# Patient Record
Sex: FEMALE | Race: BLACK OR AFRICAN AMERICAN | NOT HISPANIC OR LATINO | Employment: UNEMPLOYED | URBAN - METROPOLITAN AREA
[De-identification: names, ages, dates, MRNs, and addresses within clinical notes are randomized per-mention and may not be internally consistent; named-entity substitution may affect disease eponyms.]

---

## 2024-06-07 ENCOUNTER — HOSPITAL ENCOUNTER (EMERGENCY)
Facility: HOSPITAL | Age: 64
Discharge: HOME | End: 2024-06-07
Payer: MEDICARE

## 2024-06-07 ENCOUNTER — APPOINTMENT (OUTPATIENT)
Dept: RADIOLOGY | Facility: HOSPITAL | Age: 64
End: 2024-06-07
Payer: MEDICARE

## 2024-06-07 VITALS
HEART RATE: 89 BPM | RESPIRATION RATE: 18 BRPM | TEMPERATURE: 96.5 F | BODY MASS INDEX: 37.38 KG/M2 | OXYGEN SATURATION: 99 % | WEIGHT: 198 LBS | SYSTOLIC BLOOD PRESSURE: 173 MMHG | DIASTOLIC BLOOD PRESSURE: 84 MMHG | HEIGHT: 61 IN

## 2024-06-07 DIAGNOSIS — M25.551 PAIN OF RIGHT HIP: ICD-10-CM

## 2024-06-07 DIAGNOSIS — M25.511 ACUTE PAIN OF RIGHT SHOULDER: ICD-10-CM

## 2024-06-07 DIAGNOSIS — M25.561 ACUTE PAIN OF RIGHT KNEE: Primary | ICD-10-CM

## 2024-06-07 PROCEDURE — 73552 X-RAY EXAM OF FEMUR 2/>: CPT | Mod: RT

## 2024-06-07 PROCEDURE — 99284 EMERGENCY DEPT VISIT MOD MDM: CPT

## 2024-06-07 PROCEDURE — 73030 X-RAY EXAM OF SHOULDER: CPT | Mod: RT

## 2024-06-07 PROCEDURE — 73564 X-RAY EXAM KNEE 4 OR MORE: CPT | Mod: RT

## 2024-06-07 PROCEDURE — 73552 X-RAY EXAM OF FEMUR 2/>: CPT | Mod: RIGHT SIDE | Performed by: INTERNAL MEDICINE

## 2024-06-07 PROCEDURE — 2500000001 HC RX 250 WO HCPCS SELF ADMINISTERED DRUGS (ALT 637 FOR MEDICARE OP): Mod: SE

## 2024-06-07 PROCEDURE — 73030 X-RAY EXAM OF SHOULDER: CPT | Mod: RIGHT SIDE | Performed by: INTERNAL MEDICINE

## 2024-06-07 PROCEDURE — 73502 X-RAY EXAM HIP UNI 2-3 VIEWS: CPT | Mod: RT

## 2024-06-07 PROCEDURE — 73502 X-RAY EXAM HIP UNI 2-3 VIEWS: CPT | Mod: RIGHT SIDE | Performed by: INTERNAL MEDICINE

## 2024-06-07 PROCEDURE — 73564 X-RAY EXAM KNEE 4 OR MORE: CPT | Mod: RIGHT SIDE | Performed by: INTERNAL MEDICINE

## 2024-06-07 RX ORDER — ACETAMINOPHEN 325 MG/1
650 TABLET ORAL EVERY 6 HOURS PRN
Qty: 20 TABLET | Refills: 0 | Status: SHIPPED | OUTPATIENT
Start: 2024-06-07 | End: 2024-06-10

## 2024-06-07 RX ORDER — IBUPROFEN 600 MG/1
600 TABLET ORAL EVERY 6 HOURS PRN
Qty: 16 TABLET | Refills: 0 | Status: SHIPPED | OUTPATIENT
Start: 2024-06-07 | End: 2024-06-10

## 2024-06-07 RX ORDER — ACETAMINOPHEN 325 MG/1
975 TABLET ORAL ONCE
Status: COMPLETED | OUTPATIENT
Start: 2024-06-07 | End: 2024-06-07

## 2024-06-07 RX ORDER — IBUPROFEN 400 MG/1
400 TABLET ORAL ONCE
Status: COMPLETED | OUTPATIENT
Start: 2024-06-07 | End: 2024-06-07

## 2024-06-07 RX ADMIN — ACETAMINOPHEN 975 MG: 325 TABLET ORAL at 09:43

## 2024-06-07 RX ADMIN — IBUPROFEN 400 MG: 400 TABLET, FILM COATED ORAL at 09:42

## 2024-06-07 ASSESSMENT — LIFESTYLE VARIABLES
TOTAL SCORE: 0
EVER FELT BAD OR GUILTY ABOUT YOUR DRINKING: NO
HAVE PEOPLE ANNOYED YOU BY CRITICIZING YOUR DRINKING: NO
HAVE YOU EVER FELT YOU SHOULD CUT DOWN ON YOUR DRINKING: NO
EVER HAD A DRINK FIRST THING IN THE MORNING TO STEADY YOUR NERVES TO GET RID OF A HANGOVER: NO

## 2024-06-07 ASSESSMENT — PAIN - FUNCTIONAL ASSESSMENT: PAIN_FUNCTIONAL_ASSESSMENT: 0-10

## 2024-06-07 ASSESSMENT — PAIN SCALES - GENERAL: PAINLEVEL_OUTOF10: 8

## 2024-06-07 NOTE — ED PROVIDER NOTES
HPI   Chief Complaint   Patient presents with    Knee Pain       63-year-old female with history of substance use disorder including with marijuana, crack, tobacco, as well as chronic hepatitis, presents for chief complaint of right knee pain, hip pain, and shoulder pain after slip and fall on a strawberry at the store yesterday.  Denies hitting her head or losing consciousness.  Pain radiates from the knee through her thigh into the hip.  Also endorses right shoulder pain is worse on movement.  Denies any numbness or tingling.  Denies any chest or abdominal pain.  No dyspnea.  Denies any vision changes or dizziness.  No changes in urination or bowel movement such as incontinence or retention.                          No data recorded                   Patient History   No past medical history on file.  No past surgical history on file.  No family history on file.  Social History     Tobacco Use    Smoking status: Not on file    Smokeless tobacco: Not on file   Substance Use Topics    Alcohol use: Not on file    Drug use: Not on file       Physical Exam   ED Triage Vitals [06/07/24 0651]   Temperature Heart Rate Respirations BP   35.8 °C (96.5 °F) 89 18 173/84      Pulse Ox Temp Source Heart Rate Source Patient Position   99 % Tympanic -- --      BP Location FiO2 (%)     -- --       Physical Exam  Constitutional:       Appearance: Normal appearance.   HENT:      Head: Normocephalic and atraumatic.      Mouth/Throat:      Mouth: Mucous membranes are moist.      Pharynx: Oropharynx is clear.   Eyes:      Extraocular Movements: Extraocular movements intact.      Conjunctiva/sclera: Conjunctivae normal.      Pupils: Pupils are equal, round, and reactive to light.   Cardiovascular:      Rate and Rhythm: Normal rate and regular rhythm.      Pulses: Normal pulses.      Heart sounds: Normal heart sounds.   Pulmonary:      Effort: Pulmonary effort is normal.      Breath sounds: Normal breath sounds.   Abdominal:       General: Abdomen is flat.      Palpations: Abdomen is soft.   Musculoskeletal:         General: Normal range of motion.      Cervical back: Normal range of motion and neck supple.      Comments: No midline C, T, L-spine tenderness.  Tenderness to the anterior lateral right shoulder.  Worse with manipulation of the joint.  Full range of motion now.  Also tender to the anterior right knee around the patella without crepitus or deformity.  Right hip tender without crepitus or deformity as well.  MSPs intact in all extremities.   Skin:     General: Skin is warm and dry.      Capillary Refill: Capillary refill takes less than 2 seconds.   Neurological:      General: No focal deficit present.      Mental Status: She is alert and oriented to person, place, and time.   Psychiatric:         Mood and Affect: Mood normal.         Behavior: Behavior normal.         Thought Content: Thought content normal.         Judgment: Judgment normal.         ED Course & MDM   Diagnoses as of 06/07/24 0847   Acute pain of right knee   Pain of right hip   Acute pain of right shoulder       Medical Decision Making  Vital signs reviewed, significant for hypertension at 173/84.  Patient overall is well-appearing and in no apparent distress .  Send without difficulty.  Diagnostic testing performed with x-rays.  Concern for fracture or dislocation potentially.  X-ray showed no acute findings.  Patient was given Tylenol Motrin for pain control.  Endorsed some slight improvement.  She is ambulatory with steady gait but with slight limp.  Advised to return with any new or worsening symptoms and to follow-up with primary care.  Patient agreed with this plan.  Discharged stable condition.        Procedure  Procedures     Nadir Crespo, RAHEL-CNP  06/07/24 8895

## 2024-06-07 NOTE — ED TRIAGE NOTES
PT to the ED for right sided/knee pain after slipping on a strawberry at the store. No head trauma

## 2024-06-10 RX ORDER — IBUPROFEN 600 MG/1
600 TABLET ORAL EVERY 6 HOURS PRN
Qty: 16 TABLET | Refills: 0 | Status: SHIPPED | OUTPATIENT
Start: 2024-06-10 | End: 2024-06-14

## 2024-06-10 RX ORDER — ACETAMINOPHEN 325 MG/1
650 TABLET ORAL EVERY 6 HOURS PRN
Qty: 20 TABLET | Refills: 0 | Status: SHIPPED | OUTPATIENT
Start: 2024-06-10 | End: 2024-06-15

## 2025-02-27 ENCOUNTER — APPOINTMENT (OUTPATIENT)
Dept: OPHTHALMOLOGY | Facility: CLINIC | Age: 65
End: 2025-02-27
Payer: MEDICARE

## 2025-04-01 ENCOUNTER — APPOINTMENT (OUTPATIENT)
Dept: OPHTHALMOLOGY | Facility: CLINIC | Age: 65
End: 2025-04-01
Payer: MEDICARE

## 2025-05-22 ENCOUNTER — APPOINTMENT (OUTPATIENT)
Dept: OPHTHALMOLOGY | Facility: CLINIC | Age: 65
End: 2025-05-22
Payer: MEDICARE

## 2025-05-22 DIAGNOSIS — H52.4 MYOPIA OF LEFT EYE WITH ASTIGMATISM AND PRESBYOPIA: ICD-10-CM

## 2025-05-22 DIAGNOSIS — H52.4 HYPEROPIA OF RIGHT EYE WITH ASTIGMATISM AND PRESBYOPIA: Primary | ICD-10-CM

## 2025-05-22 DIAGNOSIS — H40.1134 PRIMARY OPEN ANGLE GLAUCOMA (POAG) OF BOTH EYES, INDETERMINATE STAGE: ICD-10-CM

## 2025-05-22 DIAGNOSIS — H52.201 HYPEROPIA OF RIGHT EYE WITH ASTIGMATISM AND PRESBYOPIA: Primary | ICD-10-CM

## 2025-05-22 DIAGNOSIS — H52.01 HYPEROPIA OF RIGHT EYE WITH ASTIGMATISM AND PRESBYOPIA: Primary | ICD-10-CM

## 2025-05-22 DIAGNOSIS — H25.813 COMBINED FORM OF AGE-RELATED CATARACT, BOTH EYES: ICD-10-CM

## 2025-05-22 DIAGNOSIS — H52.12 MYOPIA OF LEFT EYE WITH ASTIGMATISM AND PRESBYOPIA: ICD-10-CM

## 2025-05-22 DIAGNOSIS — H52.202 MYOPIA OF LEFT EYE WITH ASTIGMATISM AND PRESBYOPIA: ICD-10-CM

## 2025-05-22 PROCEDURE — 92015 DETERMINE REFRACTIVE STATE: CPT | Performed by: OPTOMETRIST

## 2025-05-22 PROCEDURE — 92133 CPTRZD OPH DX IMG PST SGM ON: CPT | Performed by: OPTOMETRIST

## 2025-05-22 PROCEDURE — 92004 COMPRE OPH EXAM NEW PT 1/>: CPT | Performed by: OPTOMETRIST

## 2025-05-22 PROCEDURE — 76514 ECHO EXAM OF EYE THICKNESS: CPT | Performed by: OPTOMETRIST

## 2025-05-22 RX ORDER — OXYMETAZOLINE HYDROCHLORIDE 0.05 G/100ML
SPRAY NASAL EVERY 12 HOURS
COMMUNITY
Start: 2023-07-02

## 2025-05-22 RX ORDER — METHIMAZOLE 5 MG/1
TABLET ORAL
COMMUNITY
Start: 2025-01-08

## 2025-05-22 RX ORDER — CHOLECALCIFEROL (VITAMIN D3) 25 MCG
25 TABLET ORAL DAILY
COMMUNITY

## 2025-05-22 RX ORDER — GABAPENTIN 100 MG/1
CAPSULE ORAL
COMMUNITY
Start: 2025-01-08

## 2025-05-22 RX ORDER — BRIMONIDINE TARTRATE AND TIMOLOL MALEATE 2; 5 MG/ML; MG/ML
1 SOLUTION OPHTHALMIC 2 TIMES DAILY
Qty: 10 ML | Refills: 2 | Status: SHIPPED | OUTPATIENT
Start: 2025-05-22

## 2025-05-22 ASSESSMENT — ENCOUNTER SYMPTOMS
CONSTITUTIONAL NEGATIVE: 0
NEUROLOGICAL NEGATIVE: 0
ENDOCRINE NEGATIVE: 1
RESPIRATORY NEGATIVE: 0
CARDIOVASCULAR NEGATIVE: 0
PSYCHIATRIC NEGATIVE: 0
EYES NEGATIVE: 0
HEMATOLOGIC/LYMPHATIC NEGATIVE: 0
GASTROINTESTINAL NEGATIVE: 0
MUSCULOSKELETAL NEGATIVE: 0
ALLERGIC/IMMUNOLOGIC NEGATIVE: 0

## 2025-05-22 ASSESSMENT — REFRACTION_MANIFEST
OD_AXIS: 135
OS_SPHERE: -0.75
OS_AXIS: 035
OD_SPHERE: +1.00
OD_CYLINDER: -1.00
OD_SPHERE: +1.00
OS_CYLINDER: SPHERE
OS_SPHERE: -0.75
OS_CYLINDER: -0.25
OS_ADD: +2.50
OD_CYLINDER: -1.00
OD_AXIS: 135
METHOD_AUTOREFRACTION: 1
OD_ADD: +2.50

## 2025-05-22 ASSESSMENT — EXTERNAL EXAM - RIGHT EYE: OD_EXAM: NORMAL

## 2025-05-22 ASSESSMENT — CONF VISUAL FIELD
OD_SUPERIOR_NASAL_RESTRICTION: 3
OS_SUPERIOR_TEMPORAL_RESTRICTION: 3
OD_INFERIOR_TEMPORAL_RESTRICTION: 3
OD_SUPERIOR_TEMPORAL_RESTRICTION: 3
OS_INFERIOR_NASAL_RESTRICTION: 3
OD_INFERIOR_NASAL_RESTRICTION: 3

## 2025-05-22 ASSESSMENT — TONOMETRY
OD_IOP_MMHG: 38
IOP_METHOD: GOLDMANN APPLANATION
OS_IOP_MMHG: 36

## 2025-05-22 ASSESSMENT — VISUAL ACUITY
OD_PH_SC: 20/40
OD_PH_SC+: -1
OS_SC: 20/40
OS_SC+: -2+2
OD_SC: 20/60
OD_SC+: -1
METHOD: SNELLEN - LINEAR

## 2025-05-22 ASSESSMENT — CUP TO DISC RATIO
OD_RATIO: 0.9
OS_RATIO: 0.85

## 2025-05-22 ASSESSMENT — SLIT LAMP EXAM - LIDS
COMMENTS: NORMAL
COMMENTS: NORMAL

## 2025-05-22 ASSESSMENT — PACHYMETRY
OS_CT(UM): 488
OD_CT(UM): 476
EXAM_DATE: 5/22/2025

## 2025-05-22 ASSESSMENT — EXTERNAL EXAM - LEFT EYE: OS_EXAM: NORMAL

## 2025-05-22 NOTE — ASSESSMENT & PLAN NOTE
Right eye: 1+ NS; 1+ cortical  Left eye: 1+ NS, 1+ cortical   Not visually significant.  Pt educated on findings and importance of UV protection when outdoors. Recommend anti-glare coating in lenses to reduce symptoms of night glare. Continue to monitor annually at this time. Pt voiced understanding.

## 2025-05-22 NOTE — ASSESSMENT & PLAN NOTE
Mild Rx. BCVA decreased OD likely due to glaucoma OD>OS.  Pt educated on findings. Release Rx for full time wear. Discussed adaptation. Monitor annually. Pt voiced understanding.

## 2025-05-22 NOTE — PROGRESS NOTES
Assessment/Plan   Problem List Items Addressed This Visit          Eye/Vision problems    Primary open angle glaucoma (POAG) of both eyes, indeterminate stage    FMHx: Positive (?) father  CD Ratio: OD: 0.9/0.9, OS: 0.85/0.85 - Severe IT notching OU  Intraocular pressure (IOP) today: 38/36mmHg  RNFL OCT (05/22/25): OD: Diffuse severe RNFL thinning: OS: Diffuse moderate RNFL thinning  Pachymetry (05/22/25): 476/488um  Pt educated on findings and importance of good compliance with medications. Discussed permanent vision loss associated with glaucoma. RTC 1-2 months for follow-up w/ IOP and HVF 24-2 kenneth fast. Start combigan BID OU - sample given and refills sent. Pt voiced understanding.            Relevant Medications    brimonidine-timoloL (Combigan) 0.2-0.5 % ophthalmic solution    Other Relevant Orders    OCT, Optic Nerve - OU - Both Eyes (Completed)    Myopia of left eye with astigmatism and presbyopia    See plan for OD.         Hyperopia of right eye with astigmatism and presbyopia - Primary    Mild Rx. BCVA decreased OD likely due to glaucoma OD>OS.  Pt educated on findings. Release Rx for full time wear. Discussed adaptation. Monitor annually. Pt voiced understanding.          Combined form of age-related cataract, both eyes    Right eye: 1+ NS; 1+ cortical  Left eye: 1+ NS, 1+ cortical   Not visually significant.  Pt educated on findings and importance of UV protection when outdoors. Recommend anti-glare coating in lenses to reduce symptoms of night glare. Continue to monitor annually at this time. Pt voiced understanding.

## 2025-05-22 NOTE — ASSESSMENT & PLAN NOTE
FMHx: Positive (?) father  CD Ratio: OD: 0.9/0.9, OS: 0.85/0.85 - Severe IT notching OU  Intraocular pressure (IOP) today: 38/36mmHg  RNFL OCT (05/22/25): OD: Diffuse severe RNFL thinning: OS: Diffuse moderate RNFL thinning  Pachymetry (05/22/25): 476/488um  Pt educated on findings and importance of good compliance with medications. Discussed permanent vision loss associated with glaucoma. RTC 1-2 months for follow-up w/ IOP and HVF 24-2 kenneth fast. Start combigan BID OU - sample given and refills sent. Pt voiced understanding.

## 2025-06-20 ENCOUNTER — APPOINTMENT (OUTPATIENT)
Dept: OPHTHALMOLOGY | Facility: CLINIC | Age: 65
End: 2025-06-20
Payer: MEDICARE